# Patient Record
Sex: FEMALE | Race: WHITE | NOT HISPANIC OR LATINO | Employment: FULL TIME | URBAN - METROPOLITAN AREA
[De-identification: names, ages, dates, MRNs, and addresses within clinical notes are randomized per-mention and may not be internally consistent; named-entity substitution may affect disease eponyms.]

---

## 2021-03-19 ENCOUNTER — IMMUNIZATION (OUTPATIENT)
Dept: FAMILY MEDICINE | Facility: CLINIC | Age: 36
End: 2021-03-19
Payer: COMMERCIAL

## 2021-03-19 DIAGNOSIS — Z23 NEED FOR VACCINATION: Primary | ICD-10-CM

## 2021-03-19 PROCEDURE — 91300 COVID-19, MRNA, LNP-S, PF, 30 MCG/0.3 ML DOSE VACCINE: CPT | Mod: PBBFAC | Performed by: FAMILY MEDICINE

## 2021-04-09 ENCOUNTER — IMMUNIZATION (OUTPATIENT)
Dept: FAMILY MEDICINE | Facility: CLINIC | Age: 36
End: 2021-04-09
Payer: COMMERCIAL

## 2021-04-09 DIAGNOSIS — Z23 NEED FOR VACCINATION: Primary | ICD-10-CM

## 2021-04-09 PROCEDURE — 0002A COVID-19, MRNA, LNP-S, PF, 30 MCG/0.3 ML DOSE VACCINE: ICD-10-PCS | Mod: CV19,S$GLB,, | Performed by: FAMILY MEDICINE

## 2021-04-09 PROCEDURE — 91300 COVID-19, MRNA, LNP-S, PF, 30 MCG/0.3 ML DOSE VACCINE: CPT | Mod: S$GLB,,, | Performed by: FAMILY MEDICINE

## 2021-04-09 PROCEDURE — 91300 COVID-19, MRNA, LNP-S, PF, 30 MCG/0.3 ML DOSE VACCINE: ICD-10-PCS | Mod: S$GLB,,, | Performed by: FAMILY MEDICINE

## 2021-04-09 PROCEDURE — 0002A COVID-19, MRNA, LNP-S, PF, 30 MCG/0.3 ML DOSE VACCINE: CPT | Mod: CV19,S$GLB,, | Performed by: FAMILY MEDICINE

## 2021-05-18 PROBLEM — Z30.2 STERILIZATION: Status: RESOLVED | Noted: 2021-05-18 | Resolved: 2021-05-18

## 2021-05-18 PROBLEM — Z30.2 STERILIZATION: Status: ACTIVE | Noted: 2021-05-18

## 2024-01-17 ENCOUNTER — OFFICE VISIT (OUTPATIENT)
Dept: URGENT CARE | Facility: CLINIC | Age: 39
End: 2024-01-17
Payer: COMMERCIAL

## 2024-01-17 VITALS
WEIGHT: 142 LBS | OXYGEN SATURATION: 98 % | DIASTOLIC BLOOD PRESSURE: 73 MMHG | RESPIRATION RATE: 16 BRPM | TEMPERATURE: 97 F | HEIGHT: 65 IN | HEART RATE: 87 BPM | SYSTOLIC BLOOD PRESSURE: 120 MMHG | BODY MASS INDEX: 23.66 KG/M2

## 2024-01-17 DIAGNOSIS — U07.1 COVID-19 VIRUS INFECTION: Primary | ICD-10-CM

## 2024-01-17 LAB
CTP QC/QA: YES
SARS-COV-2 AG RESP QL IA.RAPID: NEGATIVE

## 2024-01-17 PROCEDURE — 87811 SARS-COV-2 COVID19 W/OPTIC: CPT | Mod: QW,S$GLB,, | Performed by: FAMILY MEDICINE

## 2024-01-17 PROCEDURE — 99203 OFFICE O/P NEW LOW 30 MIN: CPT | Mod: S$GLB,,, | Performed by: FAMILY MEDICINE

## 2024-01-17 RX ORDER — PROMETHAZINE HYDROCHLORIDE 6.25 MG/5ML
SYRUP ORAL
Qty: 120 ML | Refills: 1 | Status: SHIPPED | OUTPATIENT
Start: 2024-01-17

## 2024-01-17 NOTE — PROGRESS NOTES
"Subjective:      Patient ID: Teo Monroy is a 39 y.o. female.    Vitals:  height is 5' 5" (1.651 m) and weight is 64.4 kg (142 lb). Her temporal temperature is 97.4 °F (36.3 °C). Her blood pressure is 120/73 and her pulse is 87. Her respiration is 16 and oxygen saturation is 98%.     Chief Complaint: COVID-19 Concerns    Pt presents to urgent care because she needs documentation for work for covid.  Her  and son tested positive for covid so she took an at home covid test and was positive last night. Her symptoms started yesterday afternoon.  She needs a note for work. She is currently having sore throat, congestion, runny nose, headache.     Other  This is a new problem. The current episode started yesterday. She has tried nothing for the symptoms.     ROS   Objective:     Physical Exam    Physical Exam  Vitals signs and nursing note reviewed.   Constitutional:       Appearance: Pt is well-developed. Alert, NAD.  Pt is cooperative.  Non-toxic appearance.  HENT:      Head: Normocephalic and atraumatic. .      Right Ear: External ear normal.      Left Ear: External ear normal.   Eyes:      General: Lids are normal.      Conjunctiva/sclera: Conjunctivae normal. Visual tracking is normal. Right eye exhibits no exudate. Left eye exhibits no exudate. No scleral icterus.     Pupils: Pupils are equal, round  Neck:      Musculoskeletal: range of motion without pain and neck supple.      Trachea: Trachea and phonation normal.     Cardiovascular:      Rate and Rhythm: Normal Rhythm. Extremities well perfused.   Pulmonary:      Effort: Pulmonary effort is normal. No respiratory distress. NO stridor or difficulty breathing     Breath sounds: Normal breath sounds.   Abdomen: NO obvious distention.  Musculoskeletal: Normal range of motion. No ambulation issues  Skin:     General: Skin is warm and dry. No open wounds or abrasions. No petechiae No cyanosis  no jaundice not diaphoretic, not pale, not " purpuric  Neurological:      Mental Status:Pt is alert and oriented to person, place, and time.   Psychiatric:         Speech: Speech normal.         Behavior: Behavior normal.         Thought Content: Thought content normal.         Judgment: Judgment normal.       Assessment:     1. COVID-19 virus infection        Plan:   Home test positive. Child and  positive.     COVID-19 virus infection  -     SARS Coronavirus 2 Antigen, POCT Manual Read    Other orders  -     promethazine (PHENERGAN) 6.25 mg/5 mL syrup; 10mL at night as needed  Dispense: 120 mL; Refill: 1

## 2024-02-29 ENCOUNTER — CLINICAL SUPPORT (OUTPATIENT)
Dept: OTHER | Facility: CLINIC | Age: 39
End: 2024-02-29

## 2024-02-29 DIAGNOSIS — Z00.8 ENCOUNTER FOR OTHER GENERAL EXAMINATION: ICD-10-CM

## 2024-03-01 VITALS
BODY MASS INDEX: 22.82 KG/M2 | WEIGHT: 137 LBS | SYSTOLIC BLOOD PRESSURE: 112 MMHG | HEIGHT: 65 IN | DIASTOLIC BLOOD PRESSURE: 60 MMHG

## 2024-03-01 LAB
GLUCOSE SERPL-MCNC: 92 MG/DL (ref 60–140)
HDLC SERPL-MCNC: 70 MG/DL
POC CHOLESTEROL, LDL (DOCK): 150 MG/DL
POC CHOLESTEROL, TOTAL: 230 MG/DL
TRIGL SERPL-MCNC: 57 MG/DL

## 2024-08-26 ENCOUNTER — OFFICE VISIT (OUTPATIENT)
Facility: CLINIC | Age: 39
End: 2024-08-26
Payer: COMMERCIAL

## 2024-08-26 DIAGNOSIS — L71.9 ROSACEA: Primary | ICD-10-CM

## 2024-08-26 PROCEDURE — 99204 OFFICE O/P NEW MOD 45 MIN: CPT | Mod: 95,,, | Performed by: DERMATOLOGY

## 2024-08-26 PROCEDURE — G2211 COMPLEX E/M VISIT ADD ON: HCPCS | Mod: 95,,, | Performed by: DERMATOLOGY

## 2024-08-26 RX ORDER — AZELAIC ACID 0.15 G/G
GEL TOPICAL
Qty: 50 G | Refills: 5 | Status: SHIPPED | OUTPATIENT
Start: 2024-08-26

## 2024-08-26 NOTE — PROGRESS NOTES
Subjective:      Patient ID:  Teo Monroy is a 39 y.o. female who presents for No chief complaint on file.    HPI    New patient.  Virtual.   Reports dx rosacea in 2026, hx of laser treatments with improvement in redness. Some recurrence redness, some papulopustules per patient. Requesting rx finacea - past use with success. Previously tried metronidazole gel, Soolantra.     Review of Systems    Objective:   Physical Exam   Constitutional: She appears well-developed and well-nourished. No distress.   Neurological: She is alert and oriented to person, place, and time. She is not disoriented.   Psychiatric: She has a normal mood and affect.   Skin:               Diagram Legend     Erythematous scaling macule/papule c/w actinic keratosis       Vascular papule c/w angioma      Pigmented verrucoid papule/plaque c/w seborrheic keratosis      Yellow umbilicated papule c/w sebaceous hyperplasia      Irregularly shaped tan macule c/w lentigo     1-2 mm smooth white papules consistent with Milia      Movable subcutaneous cyst with punctum c/w epidermal inclusion cyst      Subcutaneous movable cyst c/w pilar cyst      Firm pink to brown papule c/w dermatofibroma      Pedunculated fleshy papule(s) c/w skin tag(s)      Evenly pigmented macule c/w junctional nevus     Mildly variegated pigmented, slightly irregular-bordered macule c/w mildly atypical nevus      Flesh colored to evenly pigmented papule c/w intradermal nevus       Pink pearly papule/plaque c/w basal cell carcinoma      Erythematous hyperkeratotic cursted plaque c/w SCC      Surgical scar with no sign of skin cancer recurrence      Open and closed comedones      Inflammatory papules and pustules      Verrucoid papule consistent consistent with wart     Erythematous eczematous patches and plaques     Dystrophic onycholytic nail with subungual debris c/w onychomycosis     Umbilicated papule    Erythematous-base heme-crusted tan verrucoid plaque consistent with  inflamed seborrheic keratosis     Erythematous Silvery Scaling Plaque c/w Psoriasis     See annotation    Assessment / Plan:        Rosacea  -     azelaic acid (AZELEX) 15 % gel; Apply to rosacea prone areas of face 1-2x daily.  Dispense: 50 g; Refill: 5    - Discussed diagnosis, etiology, and treatment options.   - Resume azelaic acid 15% gel 1-2x daily as above.   - Counseled on potential SE of medication(s) and instructed on use.  - Avoid triggers, photo protect.   - Discussed best treatment for redness being cosmetic vascular laser.          Follow up in about 1 year (around 8/26/2025) for refills, sooner PRN.    The patient location is: [place]  The chief complaint leading to consultation is: rosacea    Visit type: audiovisual    Face to Face time with patient: 10 mins   15 minutes of total time spent on the encounter, which includes face to face time and non-face to face time preparing to see the patient (eg, review of tests), Obtaining and/or reviewing separately obtained history, Documenting clinical information in the electronic or other health record, Independently interpreting results (not separately reported) and communicating results to the patient/family/caregiver, or Care coordination (not separately reported). Each patient to whom he or she provides medical services by telemedicine is:  (1) informed of the relationship between the physician and patient and the respective role of any other health care provider with respect to management of the patient; and (2) notified that he or she may decline to receive medical services by telemedicine and may withdraw from such care at any time.

## 2024-12-20 ENCOUNTER — OFFICE VISIT (OUTPATIENT)
Dept: FAMILY MEDICINE | Facility: CLINIC | Age: 39
End: 2024-12-20
Payer: COMMERCIAL

## 2024-12-20 ENCOUNTER — LAB VISIT (OUTPATIENT)
Dept: LAB | Facility: HOSPITAL | Age: 39
End: 2024-12-20
Payer: COMMERCIAL

## 2024-12-20 VITALS
DIASTOLIC BLOOD PRESSURE: 60 MMHG | HEART RATE: 66 BPM | OXYGEN SATURATION: 97 % | SYSTOLIC BLOOD PRESSURE: 110 MMHG | WEIGHT: 145.75 LBS | BODY MASS INDEX: 24.25 KG/M2

## 2024-12-20 DIAGNOSIS — N92.0 SPOTTING BETWEEN MENSES: ICD-10-CM

## 2024-12-20 DIAGNOSIS — M25.541 ARTHRALGIA OF BOTH HANDS: ICD-10-CM

## 2024-12-20 DIAGNOSIS — M65.321 ACQUIRED TRIGGER FINGER OF BOTH INDEX FINGERS: ICD-10-CM

## 2024-12-20 DIAGNOSIS — M25.542 ARTHRALGIA OF BOTH HANDS: ICD-10-CM

## 2024-12-20 DIAGNOSIS — Z00.00 ENCOUNTER FOR GENERAL ADULT MEDICAL EXAMINATION W/O ABNORMAL FINDINGS: Primary | ICD-10-CM

## 2024-12-20 DIAGNOSIS — Z00.00 ENCOUNTER FOR GENERAL ADULT MEDICAL EXAMINATION W/O ABNORMAL FINDINGS: ICD-10-CM

## 2024-12-20 DIAGNOSIS — M65.322 ACQUIRED TRIGGER FINGER OF BOTH INDEX FINGERS: ICD-10-CM

## 2024-12-20 LAB
ALBUMIN SERPL BCP-MCNC: 3.9 G/DL (ref 3.5–5.2)
ALP SERPL-CCNC: 56 U/L (ref 40–150)
ALT SERPL W/O P-5'-P-CCNC: 12 U/L (ref 10–44)
ANION GAP SERPL CALC-SCNC: 7 MMOL/L (ref 8–16)
AST SERPL-CCNC: 17 U/L (ref 10–40)
BILIRUB SERPL-MCNC: 0.4 MG/DL (ref 0.1–1)
BUN SERPL-MCNC: 10 MG/DL (ref 6–20)
CALCIUM SERPL-MCNC: 9.7 MG/DL (ref 8.7–10.5)
CHLORIDE SERPL-SCNC: 109 MMOL/L (ref 95–110)
CO2 SERPL-SCNC: 23 MMOL/L (ref 23–29)
CREAT SERPL-MCNC: 0.8 MG/DL (ref 0.5–1.4)
EST. GFR  (NO RACE VARIABLE): >60 ML/MIN/1.73 M^2
ESTIMATED AVG GLUCOSE: 111 MG/DL (ref 68–131)
FERRITIN SERPL-MCNC: 38 NG/ML (ref 20–300)
GLUCOSE SERPL-MCNC: 89 MG/DL (ref 70–110)
HBA1C MFR BLD: 5.5 % (ref 4–5.6)
IRON SERPL-MCNC: 90 UG/DL (ref 30–160)
POTASSIUM SERPL-SCNC: 4 MMOL/L (ref 3.5–5.1)
PROT SERPL-MCNC: 7.2 G/DL (ref 6–8.4)
RHEUMATOID FACT SERPL-ACNC: 14 IU/ML (ref 0–15)
SATURATED IRON: 24 % (ref 20–50)
SODIUM SERPL-SCNC: 139 MMOL/L (ref 136–145)
TOTAL IRON BINDING CAPACITY: 376 UG/DL (ref 250–450)
TRANSFERRIN SERPL-MCNC: 254 MG/DL (ref 200–375)
TSH SERPL DL<=0.005 MIU/L-ACNC: 0.82 UIU/ML (ref 0.4–4)

## 2024-12-20 PROCEDURE — 80053 COMPREHEN METABOLIC PANEL: CPT

## 2024-12-20 PROCEDURE — 84443 ASSAY THYROID STIM HORMONE: CPT

## 2024-12-20 PROCEDURE — 86431 RHEUMATOID FACTOR QUANT: CPT

## 2024-12-20 PROCEDURE — 83036 HEMOGLOBIN GLYCOSYLATED A1C: CPT

## 2024-12-20 PROCEDURE — 86038 ANTINUCLEAR ANTIBODIES: CPT

## 2024-12-20 PROCEDURE — 84466 ASSAY OF TRANSFERRIN: CPT

## 2024-12-20 PROCEDURE — 36415 COLL VENOUS BLD VENIPUNCTURE: CPT | Mod: PO

## 2024-12-20 PROCEDURE — 99999 PR PBB SHADOW E&M-EST. PATIENT-LVL III: CPT | Mod: PBBFAC,,,

## 2024-12-20 PROCEDURE — 82728 ASSAY OF FERRITIN: CPT

## 2024-12-20 PROCEDURE — 82306 VITAMIN D 25 HYDROXY: CPT

## 2024-12-20 RX ORDER — DICLOFENAC SODIUM 10 MG/G
2 GEL TOPICAL 4 TIMES DAILY
Qty: 200 G | Refills: 5 | Status: SHIPPED | OUTPATIENT
Start: 2024-12-20

## 2024-12-20 RX ORDER — TESTOSTERONE GEL, 1% 10 MG/G
GEL TRANSDERMAL
COMMUNITY
Start: 2023-11-20

## 2024-12-20 RX ORDER — CELECOXIB 100 MG/1
100 CAPSULE ORAL 2 TIMES DAILY PRN
Qty: 60 CAPSULE | Refills: 3 | Status: SHIPPED | OUTPATIENT
Start: 2024-12-20

## 2024-12-20 NOTE — PROGRESS NOTES
Patient ID: Teo Monroy is a 39 y.o. female.    Chief Complaint: Establish Care and Hand Pain    History of Present Illness    CHIEF COMPLAINT:  Teo presents today for hand pain and mid-cycle spotting.    HAND PAIN AND MUSCULOSKELETAL:  She reports left hand pain and loss of dexterity, particularly affecting fine motor skills such as opening water bottles and gripping writing utensils. Pain and stiffness are most severe in the morning, with difficulty grasping objects. She has a history of trigger finger symptoms in multiple digits on both hands, and previous episodes of numbness in thumb and index finger associated with video game use. She reports occasional tingling in palm. Symptoms typically worsen throughout work week due to repetitive hand manipulation tasks and improve on weekends, though left hand pain has been persistent this past week. Workup for possible autoimmune condition is in progress.    GYNECOLOGIC:  She reports mid-cycle spotting which began at age 36. Initially, progesterone helped manage the symptoms, but it is no longer effective. An ultrasound is scheduled due to persistent symptoms. A fibroid was identified in .    MEDICATIONS:  She takes Progesterone 200 mg for mid-cycle spotting and Testosterone gel for low libido, which was prescribed after low testosterone levels were identified.    PAST MEDICAL HISTORY:  She has a history of hypothyroidism which has resolved and ongoing anxiety.    SURGICAL HISTORY:  She has undergone tubal ligation, dilation and curettage (D&C), cosmetic surgery, and  sections.    ALLERGIES:  She has allergies to cashew nut, latex, and rubber.    FAMILY HISTORY:  Paternal grandfather had possible prostate cancer.    LABS:  CBC was normal. Cholesterol is borderline high. Iron deficiency is possible due to blood loss.      ROS:  Musculoskeletal: reports joint pain, reports joint stiffness         Patient Active Problem List   Diagnosis    Unspecified  hypothyroidism    Other and unspecified hyperlipidemia    Anxiety    Hyperlipidemia    Migraine headache       Current Outpatient Medications on File Prior to Visit   Medication Sig Dispense Refill    azelaic acid (AZELEX) 15 % gel Apply to rosacea prone areas of face 1-2x daily. 50 g 5    melatonin 10 mg Cap Take 10 mg by mouth nightly as needed.      progesterone (PROMETRIUM) 200 MG capsule Take 1 capsule by mouth at bedtime for 12 days on cycle days 14-25 12 capsule 11    testosterone (ANDROGEL) 1 % (50 mg/5 gram) GlPk       [DISCONTINUED] norethindrone-ethinyl estradiol (MICROGESTIN 1/20) 1-20 mg-mcg per tablet Take 1 tablet by mouth once daily. 28 tablet 11    [DISCONTINUED] oxyCODONE-acetaminophen (PERCOCET) 5-325 mg per tablet Take 2 tablets by mouth every 4 (four) hours as needed for Pain. (Patient not taking: Reported on 1/17/2024) 30 tablet 0    [DISCONTINUED] PRENATAL VITAMINS-IRON-FA ORAL Take 1 tablet by mouth once daily.        [DISCONTINUED] progesterone (PROMETRIUM) 200 MG capsule Take 1 capsule every day by oral route at bedtime for 12 days. (Patient not taking: Reported on 12/20/2024) 12 capsule 11    [DISCONTINUED] promethazine (PHENERGAN) 6.25 mg/5 mL syrup 10mL at night as needed 120 mL 1     Current Facility-Administered Medications on File Prior to Visit   Medication Dose Route Frequency Provider Last Rate Last Admin    [DISCONTINUED] HYDROmorphone injection 0.5 mg  0.5 mg Intravenous Q5 Min PRN Thad Arrieta MD   0.5 mg at 05/18/21 1413    [DISCONTINUED] lactated ringers infusion   Intravenous Continuous Gavin Foster MD 20 mL/hr at 05/18/21 1108 Restarted at 05/18/21 1309    [DISCONTINUED] lorazepam injection 0.5 mg  0.5 mg Intravenous Q5 Min PRN Thad Arrieta MD        [DISCONTINUED] ondansetron injection 4 mg  4 mg Intravenous Daily PRN Thad Arrieta MD        [DISCONTINUED] prochlorperazine injection Soln 10 mg  10 mg Intravenous Q30 Min PRN Thad Arrieta  MD           Past Medical History:   Diagnosis Date    Abnormal Pap smear     Anemia     Anxiety     Depression     Fetal demise before 22 weeks with retention of dead fetus 2013    Hyperlipidemia     Migraine headache     Previous  section 2014    Thyroid disease     hypo       Past Surgical History:   Procedure Laterality Date    AUGMENTATION OF BREAST       SECTION  01/01/2009    x 1     SECTION      x 2    COSMETIC SURGERY  2016    Breast augmentation    DILATION AND CURETTAGE OF UTERUS  2013    10+ fetal demise    DILATION AND CURETTAGE OF UTERUS      LAPAROSCOPIC LIGATION OF FALLOPIAN TUBE Bilateral 2021    Procedure: LIGATION, FALLOPIAN TUBE, LAPAROSCOPIC;  Surgeon: Yaa Cota MD;  Location: [facility];  Service: OB/GYN;  Laterality: Bilateral;    TUBAL LIGATION  2021        Family History   Problem Relation Name Age of Onset    Hyperlipidemia Mother Karen Mary     Heart disease Father Jenaro Hernandez     Alcohol abuse Father Jenaro Hernandez     Breast cancer Neg Hx      Ovarian cancer Neg Hx      Colon cancer Neg Hx         Social History     Socioeconomic History    Marital status:    Tobacco Use    Smoking status: Former     Current packs/day: 0.00     Average packs/day: 0.3 packs/day for 13.0 years (3.3 ttl pk-yrs)     Types: Cigarettes     Start date:      Quit date: 2012     Years since quittin.9    Smokeless tobacco: Never   Substance and Sexual Activity    Alcohol use: Not Currently    Drug use: Not Currently     Comment: CBD oil    Sexual activity: Yes     Partners: Male     Birth control/protection: See Surgical Hx   Social History Narrative    ** Merged History Encounter **          Social Drivers of Health     Financial Resource Strain: Patient Declined (2024)    Overall Financial Resource Strain (CARDIA)     Difficulty of Paying Living Expenses: Patient declined   Food Insecurity: Patient Declined (2024)     Hunger Vital Sign     Worried About Running Out of Food in the Last Year: Patient declined     Ran Out of Food in the Last Year: Patient declined   Physical Activity: Unknown (8/19/2024)    Exercise Vital Sign     Days of Exercise per Week: Patient declined     Minutes of Exercise per Session: 0 min   Stress: Patient Declined (8/19/2024)    [employer] - Occupational Stress Questionnaire     Feeling of Stress : Patient declined   Housing Stability: Unknown (8/19/2024)    Housing Stability Vital Sign     Unable to Pay for Housing in the Last Year: Patient declined       Review of patient's allergies indicates:   Allergen Reactions    Cashew nut Other (See Comments) and Swelling    Latex, natural rubber Dermatitis, Itching and Rash        Health Maintenance Due   Topic Date Due    Mammogram  Never done    TETANUS VACCINE  04/10/2024    COVID-19 Vaccine (3 - 2024-25 season) 09/01/2024       Objective     Vitals:    12/20/24 1046   BP: 110/60   Pulse: 66      Body mass index is 24.25 kg/m².     Physical Exam  Vitals and nursing note reviewed.   Constitutional:       General: She is not in acute distress.     Appearance: Normal appearance. She is not ill-appearing or toxic-appearing.   HENT:      Head: Normocephalic and atraumatic.      Nose: Nose normal.      Mouth/Throat:      Mouth: Mucous membranes are moist.   Eyes:      Extraocular Movements: Extraocular movements intact.   Cardiovascular:      Rate and Rhythm: Normal rate and regular rhythm.      Heart sounds: Normal heart sounds. No murmur heard.     No friction rub. No gallop.   Pulmonary:      Effort: Pulmonary effort is normal.      Breath sounds: Normal breath sounds. No wheezing, rhonchi or rales.   Musculoskeletal:      Cervical back: Neck supple.   Skin:     General: Skin is warm.   Neurological:      Mental Status: She is alert and oriented to person, place, and time.   Psychiatric:         Mood and Affect: Mood normal.          Behavior: Behavior normal.         Assessment & Plan    Evaluated hand pain and stiffness, likely due to repetitive motion at work  Considered possible autoimmune conditions; ordered rheumatologic labs to rule out  Assessed for carpal tunnel syndrome and trigger finger  Recommend conservative management with NSAIDs and topical anti-inflammatory  Delayed referral to physical medicine and rehabilitation, opting for medication management first    HAND AND WRIST PAIN:  - Started Celebrex 100 mg 2 times daily for hand pain.  - Started OTC diclofenac gel (Voltaren) 2 g applied to affected areas 4 times daily.  - Teo to ice affected hand when possible.  - Recommend using a wrist brace at night for stabilization.  - Teo to avoid activities that exacerbate symptoms, such as video tori.  - Recommend resting hand when possible.    SYNOVITIS AND TENOSYNOVITIS:  - Discussed potential for nerve conduction studies if weakness develops.  - Explained electromyogram (EMG) as a diagnostic tool for nerve issues causing weakness.    CARPAL TUNNEL SYNDROME:  - Discussed potential for nerve conduction studies if weakness develops.  - Explained electromyogram (EMG) as a diagnostic tool for nerve issues causing weakness.    MUSCLE WEAKNESS:  - Discussed potential for nerve conduction studies if weakness develops.  - Explained electromyogram (EMG) as a diagnostic tool for nerve issues causing weakness.    LONG-TERM NSAID USE:  - Explained risks associated with long-term NSAID use, including GI and cardiovascular effects.    LABS:  - Ordered CBC, metabolic panel, vitamin D level, and iron panel.  - Ordered LETITIA (antinuclear antibody) and RF (rheumatoid factor) to rule out autoimmune conditions.  - Lab results will be communicated through patient portal.    FOLLOW-UP:  - Follow up in 3 months to reassess hand symptoms.  - Contact the office sooner if symptoms worsen.         Encounter for general adult medical examination w/o abnormal  findings  -     Comprehensive Metabolic Panel; Future; Expected date: 12/20/2024  -     TSH; Future; Expected date: 12/20/2024  -     Hemoglobin A1C; Future; Expected date: 12/20/2024  -     Cancel: CBC Auto Differential; Future; Expected date: 12/20/2024  -     Misc Sendout Test, Blood 25-hydroxy vitamin D; Future; Expected date: 12/20/2024    Spotting between menses  -     IRON AND TIBC; Future; Expected date: 12/20/2024  -     FERRITIN; Future; Expected date: 12/20/2024    Arthralgia of both hands  -     LETITIA; Future; Expected date: 12/20/2024  -     RHEUMATOID FACTOR; Future; Expected date: 12/20/2024  -     celecoxib (CELEBREX) 100 MG capsule; Take 1 capsule (100 mg total) by mouth 2 (two) times daily as needed for Pain.  Dispense: 60 capsule; Refill: 3  -     Ambulatory Referral/Consult to Physical Therapy; Future; Expected date: 12/27/2024  -     diclofenac sodium (VOLTAREN ARTHRITIS PAIN) 1 % Gel; Apply 2 g topically 4 (four) times daily. Okay to substitute with size available  Dispense: 150 g; Refill: 5    Acquired trigger finger of both index fingers  -     Ambulatory Referral/Consult to Physical Therapy; Future; Expected date: 12/27/2024  -     diclofenac sodium (VOLTAREN ARTHRITIS PAIN) 1 % Gel; Apply 2 g topically 4 (four) times daily. Okay to substitute with size available  Dispense: 150 g; Refill: 5        Follow up in about 3 months (around 3/20/2025), or if symptoms worsen or fail to improve, for for arthralgia, hands, carpal tunnel .    This note was generated with the assistance of ambient listening technology. Verbal consent was obtained by the patient and accompanying visitor(s) for the recording of patient appointment to facilitate this note. I attest to having reviewed and edited the generated note for accuracy, though some syntax or spelling errors may persist. Please contact the author of this note for any clarification.        Zamzam Goode MD  12/20/2024

## 2024-12-23 LAB
25(OH)D3+25(OH)D2 SERPL-MCNC: 55 NG/ML (ref 30–96)
ANA SER QL IF: NORMAL

## 2025-03-20 ENCOUNTER — OFFICE VISIT (OUTPATIENT)
Dept: FAMILY MEDICINE | Facility: CLINIC | Age: 40
End: 2025-03-20
Payer: COMMERCIAL

## 2025-03-20 VITALS
WEIGHT: 148.38 LBS | SYSTOLIC BLOOD PRESSURE: 106 MMHG | BODY MASS INDEX: 24.69 KG/M2 | DIASTOLIC BLOOD PRESSURE: 64 MMHG | HEART RATE: 84 BPM | OXYGEN SATURATION: 99 %

## 2025-03-20 DIAGNOSIS — R29.898 DECREASED GRIP STRENGTH: ICD-10-CM

## 2025-03-20 DIAGNOSIS — M65.322 ACQUIRED TRIGGER FINGER OF BOTH INDEX FINGERS: ICD-10-CM

## 2025-03-20 DIAGNOSIS — M65.321 ACQUIRED TRIGGER FINGER OF BOTH INDEX FINGERS: ICD-10-CM

## 2025-03-20 DIAGNOSIS — M25.541 ARTHRALGIA OF BOTH HANDS: Primary | ICD-10-CM

## 2025-03-20 DIAGNOSIS — M25.542 ARTHRALGIA OF BOTH HANDS: Primary | ICD-10-CM

## 2025-03-20 PROCEDURE — 99999 PR PBB SHADOW E&M-EST. PATIENT-LVL III: CPT | Mod: PBBFAC,,,

## 2025-03-20 RX ORDER — CELECOXIB 100 MG/1
100 CAPSULE ORAL 2 TIMES DAILY PRN
Qty: 60 CAPSULE | Refills: 3 | Status: SHIPPED | OUTPATIENT
Start: 2025-03-20

## 2025-03-20 NOTE — PROGRESS NOTES
Patient ID: Teo Monroy is a 40 y.o. female.    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 She has difficulty gripping small objects, particularly straws and water bottles, due to inability to fully close fingers. Symptoms are worst upon waking with difficulty gripping sheets and pillows, but improve with movement throughout the day. She reports past episodes of finger swelling that resolved within 1-2 days, though this has not occurred recently. She has stopped playing video games due to symptoms, indicating she had to choose between being able to work or play video games.    MEDICATIONS:  She reports Celebrex is effective in managing her symptoms, particularly when taken before bedtime.    LABS:  Rheumatologic labs were normal.      ROS:  Musculoskeletal: reports limb pain, reports joint stiffness, reports muscle weakness         Problem List[1]    Medications Ordered Prior to Encounter[2]    Past Medical History:   Diagnosis Date    Abnormal Pap smear     Anemia     Anxiety     Depression     Fetal demise before 22 weeks with retention of dead fetus 2013    Hyperlipidemia     Migraine headache     Previous  section 2014    Thyroid disease     hypo       Past Surgical History:   Procedure Laterality Date    AUGMENTATION OF BREAST  2016     SECTION  01/01/2009    x 1     SECTION      x 2    COSMETIC SURGERY  2016    Breast augmentation    DILATION AND CURETTAGE OF UTERUS  2013    10+ fetal demise    DILATION AND CURETTAGE OF UTERUS      LAPAROSCOPIC LIGATION OF FALLOPIAN TUBE Bilateral 2021    Procedure: LIGATION, FALLOPIAN TUBE, LAPAROSCOPIC;  Surgeon: Yaa Cota MD;  Location: [facility]  OR;  Service: OB/GYN;  Laterality: Bilateral;    TUBAL LIGATION  05/2021        Family History   Problem Relation Name Age of Onset    Hyperlipidemia Mother Karenbryanna Hernandez     Heart disease Father Jenaro Hernandez     Alcohol abuse Father Jenaro Hernandez     Breast cancer Neg Hx      Ovarian cancer Neg Hx      Colon cancer Neg Hx         Social History[3]    Review of patient's allergies indicates:   Allergen Reactions    Cashew nut Other (See Comments) and Swelling    Latex, natural rubber Dermatitis, Itching and Rash        Health Maintenance Due   Topic Date Due    TETANUS VACCINE  04/10/2024    COVID-19 Vaccine (3 - 2024-25 season) 09/01/2024       Objective     Vitals:    03/20/25 1524   BP: 106/64   Pulse: 84      Body mass index is 24.69 kg/m².     Physical Exam  Constitutional:       Appearance: Normal appearance.   HENT:      Head: Normocephalic and atraumatic.      Nose: Nose normal.      Mouth/Throat:      Mouth: Mucous membranes are moist.   Eyes:      Extraocular Movements: Extraocular movements intact.   Cardiovascular:      Rate and Rhythm: Normal rate.   Pulmonary:      Effort: Pulmonary effort is normal. No respiratory distress.   Musculoskeletal:      Cervical back: Neck supple.      Comments: Right and left  strength mildly reduced, negative tinel's and phalens. No palmar nodule present.   Neurological:      Mental Status: She is alert and oriented to person, place, and time.   Psychiatric:         Mood and Affect: Mood normal.         Behavior: Behavior normal.         Assessment & Plan    Assessed hand pain, noting improvement during time off but recurrence with work activities.  Evaluated  strength, finding it slightly weak.  Reviewed previous rheumatologic labs, which were normal.  Initiated conservative management with ice/heat therapy and continued medication.    RIGHT WRIST PAIN:  - Monitored the patient's hand pain, which improved during time off but returns depending on work  activity.  - Evaluated the patient's  strength, which is slightly weak.  - Assessed the ongoing issue and discussed potential causes.  - Recommend applying ice/heat therapy for wrist discomfort.  - Referred the patient to physical therapy for wrist issues.  - Referred the patient to orthopedic consultation for further evaluation of wrist problems.  - Continued Celebrex for pain management.    LEFT WRIST PAIN:  - Monitored the patient's bilateral hand pain, which improved during time off but returns depending on work activity.  - Evaluated the patient's  strength, which is slightly weak in both hands.  - Assessed the ongoing bilateral issue and discussed potential causes.  - Recommend applying ice/heat therapy for wrist discomfort.  - Referred the patient to physical therapy for wrist issues.  - Referred the patient to orthopedic consultation for further evaluation of wrist problems.  - Continued Celebrex for pain management.    RIGHT FINGER PAIN:  - Monitored the patient's difficulty holding small objects and closing fingers completely.  - Noted that the patient previously experienced trigger finger in the right index finger, but this has not occurred recently.  - Evaluated the patient's ability to close fingers completely and  small objects, which is impaired.  - Assessed the ongoing issue and discussed potential causes.  - Recommend applying ice/heat therapy for hand discomfort.  - Continued Celebrex for hand pain management, particularly effective when taken before bedtime.  - Referred the patient to physical therapy for hand issues.  - Referred the patient to orthopedic consultation for further evaluation of hand problems.    LEFT FINGER PAIN:  - Monitored the patient's bilateral difficulty holding small objects and closing fingers completely.  - Noted that the patient previously experienced trigger finger in both index fingers, but this has not occurred recently.  - Evaluated the patient's ability  to close fingers completely and  small objects, which is impaired in both hands.  - Assessed the ongoing bilateral issue and discussed potential causes.  - Recommend applying ice/heat therapy for hand discomfort.  - Continued Celebrex for hand pain management, particularly effective when taken before bedtime.  - Referred the patient to physical therapy for hand issues.  - Referred the patient to orthopedic consultation for further evaluation of hand problems.    RIGHT HAND STIFFNESS:  - Monitored the patient's stiffness in the right hand, particularly in the morning, with difficulty grasping objects.  - Evaluated the patient's ability to close their hand completely and  strength, which are reduced.  - Assessed the ongoing issue and discussed potential causes.  - Recommend applying ice/heat therapy for hand discomfort.  - Referred the patient to physical therapy for hand issues.  - Referred the patient to orthopedi  c consultation for further evaluation of hand problems.  - Continued Celebrex for symptom management.    LEFT HAND STIFFNESS:  - Monitored the patient's bilateral hand stiffness, particularly in the morning, with difficulty grasping objects.  - Evaluated the patient's ability to close both hands completely and  strength, which are reduced bilaterally.  - Assessed the ongoing bilateral issue and discussed potential causes.  - Recommend applying ice/heat therapy for hand discomfort.  - Referred the patient to physical therapy for hand issues.  - Referred the patient to orthopedic consultation for further evaluation of hand problems.  - Continued Celebrex for symptom management.    FOLLOW-UP:  - Contact the office if not called by physical therapy within a few days to ensure referral was processed correctly.  - Send a message to the inbox if there are issues with the physical therapy referral.         Arthralgia of both hands  -     PT evaluate and treat; Future  -     Ambulatory referral/consult  to Orthopedics; Future; Expected date: 03/27/2025  -     celecoxib (CELEBREX) 100 MG capsule; Take 1 capsule (100 mg total) by mouth 2 (two) times daily as needed for Pain.  Dispense: 60 capsule; Refill: 3    Acquired trigger finger of both index fingers  -     PT evaluate and treat; Future    Decreased  strength  -     Ambulatory referral/consult to Orthopedics; Future; Expected date: 03/27/2025        Follow up if symptoms worsen or fail to improve.    This note was generated with the assistance of ambient listening technology. Verbal consent was obtained by the patient and accompanying visitor(s) for the recording of patient appointment to facilitate this note. I attest to having reviewed and edited the generated note for accuracy, though some syntax or spelling errors may persist. Please contact the author of this note for any clarification.        Zamzam Goode MD  03/21/2025          [1]   Patient Active Problem List  Diagnosis    Unspecified hypothyroidism    Other and unspecified hyperlipidemia    Anxiety    Hyperlipidemia    Migraine headache   [2]   Current Outpatient Medications on File Prior to Visit   Medication Sig Dispense Refill    azelaic acid (AZELEX) 15 % gel Apply to rosacea prone areas of face 1-2x daily. 50 g 5    diclofenac sodium (VOLTAREN ARTHRITIS PAIN) 1 % Gel Apply 2 grams topically 4 (four) times daily. 200 g 5    melatonin 10 mg Cap Take 10 mg by mouth nightly as needed.      progesterone (PROMETRIUM) 200 MG capsule Take 1 capsule by mouth at bedtime for 12 days on cycle days 14-25 12 capsule 11    progesterone (PROMETRIUM) 200 MG capsule Take 1 capsule every day by oral route at bedtime for 12 days. 12 capsule 11    testosterone (ANDROGEL) 1 % (50 mg/5 gram) [facility]        No current facility-administered medications on file prior to visit.   [3]   Social History  Socioeconomic History    Marital status:    Tobacco Use    Smoking status: Former     Current packs/day: 0.00      Average packs/day: 0.3 packs/day for 13.0 years (3.3 ttl pk-yrs)     Types: Cigarettes     Start date:      Quit date: 2012     Years since quittin.2    Smokeless tobacco: Never   Substance and Sexual Activity    Alcohol use: Not Currently    Drug use: Not Currently     Comment: CBD oil    Sexual activity: Yes     Partners: Male     Birth control/protection: See Surgical Hx   Social History Narrative    ** Merged History Encounter **          Social Drivers of Health     Financial Resource Strain: High Risk (3/13/2025)    Overall Financial Resource Strain (CARDIA)     Difficulty of Paying Living Expenses: Hard   Food Insecurity: Food Insecurity Present (3/13/2025)    Hunger Vital Sign     Worried About Running Out of Food in the Last Year: Sometimes true     Ran Out of Food in the Last Year: Never true   Transportation Needs: No Transportation Needs (3/13/2025)    PRAPARE - Transportation     Lack of Transportation (Medical): No     Lack of Transportation (Non-Medical): No   Physical Activity: Inactive (3/13/2025)    Exercise Vital Sign     Days of Exercise per Week: 0 days     Minutes of Exercise per Session: 0 min   Stress: Stress Concern Present (3/13/2025)    [employer] [place] [employer] - Occupational Stress Questionnaire     Feeling of Stress : Rather much   Housing Stability: Low Risk  (3/13/2025)    Housing Stability Vital Sign     Unable to Pay for Housing in the Last Year: No     Number of Times Moved in the Last Year: 0     Homeless in the Last Year: No

## 2025-03-21 PROBLEM — R29.898 DECREASED GRIP STRENGTH: Status: ACTIVE | Noted: 2025-03-21

## 2025-03-21 PROBLEM — M65.322 ACQUIRED TRIGGER FINGER OF BOTH INDEX FINGERS: Status: ACTIVE | Noted: 2025-03-21

## 2025-03-21 PROBLEM — M25.542 ARTHRALGIA OF BOTH HANDS: Status: ACTIVE | Noted: 2025-03-21

## 2025-03-21 PROBLEM — M25.541 ARTHRALGIA OF BOTH HANDS: Status: ACTIVE | Noted: 2025-03-21

## 2025-03-21 PROBLEM — M65.321 ACQUIRED TRIGGER FINGER OF BOTH INDEX FINGERS: Status: ACTIVE | Noted: 2025-03-21

## 2025-03-25 ENCOUNTER — HOSPITAL ENCOUNTER (OUTPATIENT)
Dept: RADIOLOGY | Facility: HOSPITAL | Age: 40
Discharge: HOME | End: 2025-03-25
Attending: PHYSICIAN ASSISTANT
Payer: COMMERCIAL

## 2025-03-25 ENCOUNTER — OFFICE VISIT (OUTPATIENT)
Dept: ORTHOPEDICS | Facility: CLINIC | Age: 40
End: 2025-03-25
Payer: COMMERCIAL

## 2025-03-25 DIAGNOSIS — M25.541 ARTHRALGIA OF BOTH HANDS: ICD-10-CM

## 2025-03-25 DIAGNOSIS — M25.542 ARTHRALGIA OF BOTH HANDS: Primary | ICD-10-CM

## 2025-03-25 DIAGNOSIS — M25.542 ARTHRALGIA OF BOTH HANDS: ICD-10-CM

## 2025-03-25 DIAGNOSIS — M25.541 ARTHRALGIA OF BOTH HANDS: Primary | ICD-10-CM

## 2025-03-25 DIAGNOSIS — R29.898 DECREASED GRIP STRENGTH: ICD-10-CM

## 2025-03-25 PROCEDURE — 1160F RVW MEDS BY RX/DR IN RCRD: CPT | Mod: CPTII,S$GLB,, | Performed by: PHYSICIAN ASSISTANT

## 2025-03-25 PROCEDURE — 73130 X-RAY EXAM OF HAND: CPT | Mod: TC,50,PO

## 2025-03-25 PROCEDURE — 73130 X-RAY EXAM OF HAND: CPT | Mod: 26,50,, | Performed by: STUDENT IN AN ORGANIZED HEALTH CARE EDUCATION/TRAINING PROGRAM

## 2025-03-25 PROCEDURE — 1159F MED LIST DOCD IN RCRD: CPT | Mod: CPTII,S$GLB,, | Performed by: PHYSICIAN ASSISTANT

## 2025-03-25 PROCEDURE — 99999 PR PBB SHADOW E&M-EST. PATIENT-LVL IV: CPT | Mod: PBBFAC,,, | Performed by: PHYSICIAN ASSISTANT

## 2025-03-25 PROCEDURE — 99203 OFFICE O/P NEW LOW 30 MIN: CPT | Mod: S$GLB,,, | Performed by: PHYSICIAN ASSISTANT

## 2025-03-25 RX ORDER — METHYLPREDNISOLONE 4 MG/1
TABLET ORAL
Qty: 21 EACH | Refills: 0 | Status: SHIPPED | OUTPATIENT
Start: 2025-03-25 | End: 2025-04-15

## 2025-03-26 ENCOUNTER — CLINICAL SUPPORT (OUTPATIENT)
Dept: OTHER | Facility: CLINIC | Age: 40
End: 2025-03-26

## 2025-03-26 DIAGNOSIS — Z00.8 ENCOUNTER FOR OTHER GENERAL EXAMINATION: ICD-10-CM

## 2025-03-26 NOTE — PROGRESS NOTES
3/25/2025    HPI:  Teo Monroy is a 40 y.o. female, who presents to clinic today for evaluation of her bilateral hand pain. States hand pain has been present for years. States pain is worse in the morning. States she will have on and off swelling of random finger joints. Denies any acute injuries. Denies any paresthesias. Denies any other complaints at this time.    PMHX:  Past Medical History:   Diagnosis Date    Abnormal Pap smear     Anemia     Anxiety     Depression     Fetal demise before 22 weeks with retention of dead fetus 2013    Hyperlipidemia     Migraine headache     Previous  section 2014    Thyroid disease     hypo       PSHX:  Past Surgical History:   Procedure Laterality Date    AUGMENTATION OF BREAST  2016     SECTION  01/01/2009    x 1     SECTION      x 2    COSMETIC SURGERY  2016    Breast augmentation    DILATION AND CURETTAGE OF UTERUS  2013    10+ fetal demise    DILATION AND CURETTAGE OF UTERUS      LAPAROSCOPIC LIGATION OF FALLOPIAN TUBE Bilateral 2021    Procedure: LIGATION, FALLOPIAN TUBE, LAPAROSCOPIC;  Surgeon: Yaa Cota MD;  Location: [facility];  Service: OB/GYN;  Laterality: Bilateral;    TUBAL LIGATION  2021       FMHX:  Family History   Problem Relation Name Age of Onset    Hyperlipidemia Mother Karen Arostegui     Heart disease Father Jenaro Hernandez     Alcohol abuse Father Jenaro Hernandez     Breast cancer Neg Hx      Ovarian cancer Neg Hx      Colon cancer Neg Hx         SOCHX:  Social History     Tobacco Use    Smoking status: Former     Current packs/day: 0.00     Average packs/day: 0.3 packs/day for 13.0 years (3.3 ttl pk-yrs)     Types: Cigarettes     Start date:      Quit date: 2012     Years since quittin.2    Smokeless tobacco: Never   Substance Use Topics    Alcohol use: Not Currently       ALLERGIES:  Cashew nut and Latex, natural rubber    CURRENT MEDICATIONS:  Medications Ordered Prior to  Encounter[1]    REVIEW OF SYSTEMS:  Review of Systems Complete; Negative, unless noted above.    GENERAL PHYSICAL EXAM:   There were no vitals taken for this visit.   GEN: well developed, well nourished, no acute distress   PULM: No wheezing, no respiratory distress   CV: RRR    ORTHO EXAM:   Examination of the bilateral hands reveals no edema, erythema, ecchymosis or skin breakdown. Able to make composite fist and fully extend all fingers. Full intact ROM of bilateral wrists. 5/5 /intrinsic strength. No tenderness to palpation of the bilateral hands/wrists. Normal sensation bilaterally. Cap refill less than 2 seconds.    RADIOLOGY:   X-rays of the bilateral hands showed minimal scattered degenerative changes throughout the IP joints. No other significant bony abnormalities.    ASSESSMENT:   Rheumatologic Arthritis of the bilateral hands    PLAN:  1. I discussed with Teo ARGUETA Porfirio that the best course of action is to refer her to rheumatology. We discussed we would prescribe her a medrol dose pack for significant flares. She verbally agreed with the treatment plan.     2. She was referred to Rheumatology in clinic today.    3. She was instructed to follow up PRN.           [1]   Current Outpatient Medications on File Prior to Visit   Medication Sig Dispense Refill    azelaic acid (AZELEX) 15 % gel Apply to rosacea prone areas of face 1-2x daily. 50 g 5    celecoxib (CELEBREX) 100 MG capsule Take 1 capsule (100 mg total) by mouth 2 (two) times daily as needed for Pain. 60 capsule 3    diclofenac sodium (VOLTAREN ARTHRITIS PAIN) 1 % Gel Apply 2 grams topically 4 (four) times daily. 200 g 5    melatonin 10 mg Cap Take 10 mg by mouth nightly as needed.      progesterone (PROMETRIUM) 200 MG capsule Take 1 capsule by mouth at bedtime for 12 days on cycle days 14-25 12 capsule 11    progesterone (PROMETRIUM) 200 MG capsule Take 1 capsule every day by oral route at bedtime for 12 days. 12 capsule 11    testosterone  (ANDROGEL) 1 % (50 mg/5 gram) [facility]        No current facility-administered medications on file prior to visit.

## 2025-03-27 ENCOUNTER — TELEPHONE (OUTPATIENT)
Dept: ORTHOPEDICS | Facility: CLINIC | Age: 40
End: 2025-03-27
Payer: COMMERCIAL

## 2025-03-27 VITALS
BODY MASS INDEX: 24.32 KG/M2 | SYSTOLIC BLOOD PRESSURE: 106 MMHG | HEIGHT: 65 IN | WEIGHT: 146 LBS | DIASTOLIC BLOOD PRESSURE: 64 MMHG

## 2025-03-27 LAB
GLUCOSE SERPL-MCNC: 123 MG/DL (ref 60–140)
HDLC SERPL-MCNC: 66 MG/DL
POC CHOLESTEROL, LDL (DOCK): 140 MG/DL
POC CHOLESTEROL, TOTAL: 218 MG/DL
TRIGL SERPL-MCNC: 71 MG/DL

## 2025-04-08 NOTE — PROGRESS NOTES
Subjective:      Patient ID: Teo Monroy is a 40 y.o. female.    Chief Complaint: Establish Care    HPI    Rheumatologic History:     - Diagnosis/es: -  - Family History: No PsO, IBD, or any CTD   - Gyn History:  (1 miscarriage at 11 weeks, and 1 right after a pregnancy test); no VTE  - Social History: Former smoker (age 14 to 27), denies alcohol intake   - Positive serologies: -  - Negative serologies: LETITIA, RF  - Infectious screening labs: -  - Imaging:   - Xray hands (3/25/25) Minimal degenerative changes of several bilateral interphalangeal joints. Remaining cartilage spaces appear maintained. No acute fracture, dislocation, or osseous destruction. Soft tissues are unremarkable.   - Previous Treatments:   - Ibuprofen   - Current Treatments:    - Celebrex: helpful with pain but not stiffness  - Family Planning: s/p tubal ligation    Interval History:   This is a 40 year old woman with history of anxiety, rosacea, HLD, and recurrent corneal erosion (?) who was referred to rheumatology for bilateral hand pain and swelling. 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 Especially over the past year, she has lost dexterity in her hands and her  has weakened. She also reports morning stiffness lasting about 30 minutes. She does report an episode of sausage-like swelling of the 2nd digit of her left hand in , and again another time- both times responded to ibuprofen. She has a rash on her back but was told by derm its tinea, and numbness and tingling between the shoulder blades.    The patient denies unexplained fevers, patchy alopecia, oral/nasal ulcers, recurrent sinusitis/ear infections, rashes, pleuritic chest pain, shortness of breath, cough, abdominal pain, diarrhea, bloody stool, back pain, weakness,  dysphagia, genital ulcers, and Raynaud's.     Objective:   /70 (BP Location: Right arm, Patient Position: Sitting)   Pulse 83    "Ht 5' 5" (1.651 m)   Wt 67.2 kg (148 lb 2.4 oz)   BMI 24.65 kg/m²   Physical Exam   Constitutional: normal appearance.   HENT:   Head: Normocephalic and atraumatic.   Cardiovascular: Normal rate, regular rhythm and normal heart sounds.   Pulmonary/Chest: Effort normal and breath sounds normal.   Musculoskeletal:      Comments: No synovitis, dactylitis, enthesitis, effusions  Mildly decreased  strength   Neurological: She is alert.   Skin: Skin is warm and dry. No rash noted.   No skin thickening, telangiectasias, calcinosis, psoriasiform lesions, lupoid lesions         No data to display    Labs (12/20/24)   CMP CR, AST, ALT WNL   Vit D 55     Assessment:     1. Morning joint stiffness of hand, unspecified laterality    2. Arthralgia of both hands    3. Decreased  strength    4. NSAID long-term use      This is a 40 year old woman with history of anxiety, rosacea, HLD, and recurrent corneal erosion (?) who was referred to rheumatology for bilateral hand pain and swelling. 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 Especially over the past year, she has lost dexterity in her hands and her  has weakened. She also reports morning stiffness lasting about 30 minutes. She does report an episode of sausage-like swelling of the 2nd digit of her left hand in 2023, and again another time- both times responded to ibuprofen. She has a rash on her back but was told by derm its tinea, and numbness and tingling between the shoulder blades. She has no back pain, rashes, or personal/family history of uveitis, PsO or IBD. She has no obvious synovitis on exam, but  is weak, and she has significant stiffness. Obtain MRI bilateral hands and wrists. Labs outlined below.    Plan:     Problem List Items Addressed This Visit          Orthopedic    Arthralgia of both hands    Relevant Orders    MRI Hand Wrist W WO Bilat_Rheumatoid    CBC Auto Differential    " Sedimentation rate    C-Reactive Protein    Creatinine, Serum    AST (SGOT)    ALT (SGPT)    Decreased  strength    Relevant Orders    MRI Hand Wrist W WO Bilat_Rheumatoid    CBC Auto Differential    Sedimentation rate    C-Reactive Protein    Creatinine, Serum    AST (SGOT)    ALT (SGPT)     Other Visit Diagnoses         Morning joint stiffness of hand, unspecified laterality    -  Primary    Relevant Orders    MRI Hand Wrist W WO Bilat_Rheumatoid    CBC Auto Differential    Sedimentation rate    C-Reactive Protein    Creatinine, Serum    AST (SGOT)    ALT (SGPT)      NSAID long-term use        Relevant Orders    CBC Auto Differential    Sedimentation rate    C-Reactive Protein    Creatinine, Serum    AST (SGOT)    ALT (SGPT)          Follow up after MRI    60 minutes of total time spent on the encounter, which includes face to face time and non-face to face time preparing to see the patient (eg, review of tests), Obtaining and/or reviewing separately obtained history, Documenting clinical information in the electronic or other health record, Independently interpreting results (not separately reported) and communicating results to the patient/family/caregiver, or Care coordination (not separately reported).     This note was prepared with [employer] Direct voice recognition transcription software. Garbled syntax, mangled pronouns, and other bizarre constructions may be attributed to that software system       Sabrina Holland M.D.  Rheumatology Dept  [place], [place]

## 2025-04-09 ENCOUNTER — OFFICE VISIT (OUTPATIENT)
Dept: RHEUMATOLOGY | Facility: CLINIC | Age: 40
End: 2025-04-09
Payer: COMMERCIAL

## 2025-04-09 VITALS
HEIGHT: 65 IN | BODY MASS INDEX: 24.68 KG/M2 | SYSTOLIC BLOOD PRESSURE: 115 MMHG | HEART RATE: 83 BPM | WEIGHT: 148.13 LBS | DIASTOLIC BLOOD PRESSURE: 70 MMHG

## 2025-04-09 DIAGNOSIS — M25.542 ARTHRALGIA OF BOTH HANDS: ICD-10-CM

## 2025-04-09 DIAGNOSIS — R29.898 DECREASED GRIP STRENGTH: ICD-10-CM

## 2025-04-09 DIAGNOSIS — M25.541 ARTHRALGIA OF BOTH HANDS: ICD-10-CM

## 2025-04-09 DIAGNOSIS — Z79.1 NSAID LONG-TERM USE: ICD-10-CM

## 2025-04-09 DIAGNOSIS — M25.649 MORNING JOINT STIFFNESS OF HAND, UNSPECIFIED LATERALITY: Primary | ICD-10-CM

## 2025-04-09 PROCEDURE — 3008F BODY MASS INDEX DOCD: CPT | Mod: CPTII,S$GLB,, | Performed by: STUDENT IN AN ORGANIZED HEALTH CARE EDUCATION/TRAINING PROGRAM

## 2025-04-09 PROCEDURE — 3078F DIAST BP <80 MM HG: CPT | Mod: CPTII,S$GLB,, | Performed by: STUDENT IN AN ORGANIZED HEALTH CARE EDUCATION/TRAINING PROGRAM

## 2025-04-09 PROCEDURE — 99999 PR PBB SHADOW E&M-EST. PATIENT-LVL IV: CPT | Mod: PBBFAC,,, | Performed by: STUDENT IN AN ORGANIZED HEALTH CARE EDUCATION/TRAINING PROGRAM

## 2025-04-09 PROCEDURE — 3074F SYST BP LT 130 MM HG: CPT | Mod: CPTII,S$GLB,, | Performed by: STUDENT IN AN ORGANIZED HEALTH CARE EDUCATION/TRAINING PROGRAM

## 2025-04-09 PROCEDURE — 99205 OFFICE O/P NEW HI 60 MIN: CPT | Mod: S$GLB,,, | Performed by: STUDENT IN AN ORGANIZED HEALTH CARE EDUCATION/TRAINING PROGRAM

## 2025-04-09 PROCEDURE — 1159F MED LIST DOCD IN RCRD: CPT | Mod: CPTII,S$GLB,, | Performed by: STUDENT IN AN ORGANIZED HEALTH CARE EDUCATION/TRAINING PROGRAM

## 2025-04-09 PROCEDURE — 1160F RVW MEDS BY RX/DR IN RCRD: CPT | Mod: CPTII,S$GLB,, | Performed by: STUDENT IN AN ORGANIZED HEALTH CARE EDUCATION/TRAINING PROGRAM
